# Patient Record
Sex: FEMALE | Race: WHITE | Employment: UNEMPLOYED | URBAN - METROPOLITAN AREA
[De-identification: names, ages, dates, MRNs, and addresses within clinical notes are randomized per-mention and may not be internally consistent; named-entity substitution may affect disease eponyms.]

---

## 2022-10-25 ENCOUNTER — HOSPITAL ENCOUNTER (OUTPATIENT)
Age: 32
Discharge: HOME | End: 2022-10-25
Payer: COMMERCIAL

## 2022-10-25 VITALS
SYSTOLIC BLOOD PRESSURE: 121 MMHG | HEART RATE: 93 BPM | OXYGEN SATURATION: 98 % | HEIGHT: 63 IN | DIASTOLIC BLOOD PRESSURE: 77 MMHG | BODY MASS INDEX: 24.8 KG/M2 | TEMPERATURE: 98 F | WEIGHT: 140 LBS | RESPIRATION RATE: 18 BRPM

## 2022-10-25 DIAGNOSIS — J02.0 STREPTOCOCCAL SORE THROAT: ICD-10-CM

## 2022-10-25 DIAGNOSIS — R07.0 THROAT PAIN: Primary | ICD-10-CM

## 2022-10-25 LAB
S PYO AG THROAT QL: POSITIVE
SARS-COV-2 RNA RESP QL NAA+PROBE: NOT DETECTED

## 2022-10-25 PROCEDURE — 99203 OFFICE O/P NEW LOW 30 MIN: CPT | Performed by: NURSE PRACTITIONER

## 2022-10-25 PROCEDURE — A9150 MISC/EXPER NON-PRESCRIPT DRU: HCPCS | Performed by: NURSE PRACTITIONER

## 2022-10-25 PROCEDURE — 87880 STREP A ASSAY W/OPTIC: CPT | Performed by: NURSE PRACTITIONER

## 2022-10-25 PROCEDURE — U0002 COVID-19 LAB TEST NON-CDC: HCPCS | Performed by: NURSE PRACTITIONER

## 2022-10-25 RX ORDER — ACETAMINOPHEN 500 MG
1000 TABLET ORAL ONCE
Status: COMPLETED | OUTPATIENT
Start: 2022-10-25 | End: 2022-10-25

## 2022-10-25 RX ORDER — AMOXICILLIN 500 MG/1
1000 TABLET, FILM COATED ORAL DAILY
Qty: 20 TABLET | Refills: 0 | Status: SHIPPED | OUTPATIENT
Start: 2022-10-25 | End: 2022-11-04

## 2022-10-25 NOTE — ED INITIAL ASSESSMENT (HPI)
Pt here w c/o sore throat, bumps to back of throat and painful to eat, pt also w c/o cough, runny nose and felt warm at home. Pt states symptoms x 2 days.

## 2023-12-26 ENCOUNTER — OFFICE VISIT (OUTPATIENT)
Dept: OBGYN CLINIC | Facility: CLINIC | Age: 33
End: 2023-12-26

## 2023-12-26 VITALS
WEIGHT: 151.81 LBS | SYSTOLIC BLOOD PRESSURE: 98 MMHG | DIASTOLIC BLOOD PRESSURE: 66 MMHG | BODY MASS INDEX: 27 KG/M2 | HEART RATE: 72 BPM

## 2023-12-26 DIAGNOSIS — N92.0 MENORRHAGIA WITH REGULAR CYCLE: Primary | ICD-10-CM

## 2023-12-26 DIAGNOSIS — N94.6 DYSMENORRHEA: ICD-10-CM

## 2023-12-26 DIAGNOSIS — N85.2 UTERINE HYPERTROPHY: ICD-10-CM

## 2023-12-26 PROCEDURE — 3078F DIAST BP <80 MM HG: CPT | Performed by: OBSTETRICS & GYNECOLOGY

## 2023-12-26 PROCEDURE — 3074F SYST BP LT 130 MM HG: CPT | Performed by: OBSTETRICS & GYNECOLOGY

## 2023-12-26 PROCEDURE — 99243 OFF/OP CNSLTJ NEW/EST LOW 30: CPT | Performed by: OBSTETRICS & GYNECOLOGY

## 2023-12-26 RX ORDER — LIDOCAINE HYDROCHLORIDE 20 MG/ML
1 SOLUTION ORAL; TOPICAL DAILY
COMMUNITY
Start: 2023-12-12

## 2023-12-26 RX ORDER — NAPROXEN 500 MG/1
500 TABLET ORAL 2 TIMES DAILY
COMMUNITY
Start: 2023-12-04

## 2023-12-26 RX ORDER — NORGESTIMATE AND ETHINYL ESTRADIOL 7DAYSX3 28
1 KIT ORAL DAILY
COMMUNITY
Start: 2023-12-04

## 2023-12-26 RX ORDER — FAMOTIDINE 20 MG/1
20 TABLET, FILM COATED ORAL 2 TIMES DAILY
COMMUNITY
Start: 2023-08-27

## 2023-12-26 NOTE — PROGRESS NOTES
HPI:    Patient ID: Kristan Ospina is a 26year old year old female. HPI  New patient, GYN consultation  Referred Dr. Sonia Tracy  26 yr old. Hx tubal ligation. History of 3  sections. LMP-. Referred by her primary care physician Dr. Micky Crawford of progressively worsening heavy and painful menstrual periods. They come every 30 days and last 8 days with 5 heavier days and 3 are the worst.  During those days she changes a pad every hour. Has strong cramping but has only used ibuprofen 1 tablet at a time. Counseled to use 2-3 at a time up to 4 times a day. She was prescribed oral contraceptives by her PCP but needed to discontinue due to depression after 1 month cycle. Discussed option of Mirena IUD. Patient cervix appears nulliparous and tight and may be challenging for insertion. Discussed consideration of endometrial ablation which may be challenging due to myometrial thinning and also cervical tightness. Will check myometrial thickness with early cycle transvaginal ultrasound after menses. Patient also contemplating hysterectomy as a possible alternative. Review of Systems   Constitutional: Negative. Cardiovascular: Negative. Gastrointestinal: Negative. Genitourinary: Negative. Skin: Negative. Neurological: Negative. Psychiatric/Behavioral: Negative. All other systems reviewed and are negative. No current outpatient medications on file. Physical Exam     Vitals: There were no vitals taken for this visit. Abdominal: Soft. Old healed low transverse  scar. Normal appearance and bowel sounds are normal. She exhibits no mass. There is no hepatosplenomegaly. There is no tenderness. There is no rebound and no CVA tenderness. No hernia. Hernia negative in the ventral area,  negative in the right inguinal area and negative in the left inguinal area.      Genitourinary:   Pelvic exam was performed with patient supine and chaperone present. External genitalia- normal.  Bartholin's and Brenham's glands normal.  Urethral meatus- without lesions, mass, or discharge. Urethra- normal without lesion, cyst, mass, or tenderness. Vulva- normal.  Labia majora and minora without lesions. Vagina- normal, no lesions or discharge. Moist and well supported. Bladder-  nontender. No masses. Normal support. No evidence of cystocele,  abnormal bladder neck mobility or evident urinary incontinence. Cervix- smooth, normal epithelium without lesions or discharge. No motion tenderness. Uterus- 1 1/2x normal size, shape, and contour. Nontender. No masses. Adnexa-  Nontender, no masses. Perineum- normal without lesions  Anus-  Normal appearing without lesions. ASSESSMENT/PLAN:  Menorrhagia with regular cycle  Dysmenorrhea  Uterine hypertrophy. Most consistent with adenomyosis due to myometrial appearance on ultrasound  Intolerant to oral contraceptives. Return for transvaginal ultrasound in early cycle. History of 3  sections. Check myometrial thickness for feasibility of NovaSure endometrial ablation. No outpatient encounter medications on file as of 2023. No facility-administered encounter medications on file as of 2023.

## 2024-01-29 ENCOUNTER — TELEPHONE (OUTPATIENT)
Dept: OBGYN CLINIC | Facility: CLINIC | Age: 34
End: 2024-01-29

## 2024-01-29 NOTE — TELEPHONE ENCOUNTER
Patient verified name and     Patient needing appt for US per OV note, patient to complete in early cycle. Appt  aware of scheduling details. Will call us if needing appt changed

## 2024-03-18 ENCOUNTER — TELEPHONE (OUTPATIENT)
Dept: OBGYN CLINIC | Facility: CLINIC | Age: 34
End: 2024-03-18

## 2024-03-18 NOTE — TELEPHONE ENCOUNTER
Pt called to schedule ultrasound with Dr. Garcia. Pt missed previous appointments as reminder calls are not in Kiswahili. Please call with .

## 2024-03-18 NOTE — TELEPHONE ENCOUNTER
Discussed consideration of endometrial ablation which may be challenging due to myometrial thinning and also cervical tightness. Will check myometrial thickness with early cycle transvaginal ultrasound after menses.       Patient verified name and     Patient scheuled for 3/26 with [facility] for ultrasound, complaining of pelvic pain similar to what she experienced in the past. Has been taking Ibuprofen with some relief. Patient will discuss at visit.

## 2024-03-26 ENCOUNTER — TELEPHONE (OUTPATIENT)
Dept: OBGYN CLINIC | Facility: CLINIC | Age: 34
End: 2024-03-26

## 2024-03-26 ENCOUNTER — OFFICE VISIT (OUTPATIENT)
Dept: OBGYN CLINIC | Facility: CLINIC | Age: 34
End: 2024-03-26

## 2024-03-26 DIAGNOSIS — N85.2 UTERINE HYPERTROPHY: ICD-10-CM

## 2024-03-26 DIAGNOSIS — Z01.818 PRE-OP TESTING: ICD-10-CM

## 2024-03-26 DIAGNOSIS — N94.6 DYSMENORRHEA: ICD-10-CM

## 2024-03-26 DIAGNOSIS — D64.9 ANEMIA, UNSPECIFIED TYPE: ICD-10-CM

## 2024-03-26 DIAGNOSIS — N92.0 MENORRHAGIA WITH REGULAR CYCLE: Primary | ICD-10-CM

## 2024-03-26 NOTE — PROGRESS NOTES
Subjective:     Patient ID: Julia Alvarado is a 33 year old female.    HPI  GYN follow-up visit  Patient with heavy and painful menses, uterine hypertrophy, history of 3  sections.  Last menstrual period March 15.  Transvaginal ultrasound shows a retroverted retroflexed uterus with a thin anterior lower uterine segment myometrial thickness of 2.5 mm.  Adjacent to bladder.  Small uterine fibroid.  Echogenic and heterogeneous myometrium suggestive of adenomyosis with some widening of the AP diameter.  Patient has been intolerant of hormones and OCP in the past.  Cervix is long and nulliparous and types and therefore anatomically would not accommodate IUD.  Not a candidate for endometrial ablation due to the anatomic orientation of the uterus and long and tightly closed cervix.  Patient would like definitive surgical treatment with hysterectomy.  We reviewed the risks and benefits and literature provided.  Recommend CBC and type and screen preoperatively.  Patient to see her primary care physician Dr. Leone and she says she was to have blood work.  History/Other:   Review of Systems   Constitutional: Negative.    Cardiovascular: Negative.    Gastrointestinal: Negative.    Genitourinary: Negative.    Skin: Negative.    Neurological: Negative.    Psychiatric/Behavioral: Negative.     All other systems reviewed and are negative.    Current Outpatient Medications   Medication Sig Dispense Refill    famotidine 20 MG Oral Tab Take 1 tablet (20 mg total) by mouth 2 (two) times daily. (Patient not taking: Reported on 2023)      FEROSUL 325 (65 Fe) MG Oral Tab Take 1 tablet (325 mg total) by mouth daily.      naproxen 500 MG Oral Tab Take 1 tablet (500 mg total) by mouth 2 (two) times daily. (Patient not taking: Reported on 2023)      TRI-SPRINTEC 0.18/0.215/0.25 MG-35 MCG Oral Tab Take 1 tablet by mouth daily. (Patient not taking: Reported on 2023)       Allergies:Not on File    Past Medical History:    Diagnosis Date    Anemia     Decorative tattoo     Dysmenorrhea       Past Surgical History:   Procedure Laterality Date          TUBAL LIGATION        Family History   Problem Relation Age of Onset    Diabetes Father     Cancer Mother       Social History:   Social History     Socioeconomic History    Marital status: Single   Tobacco Use    Smoking status: Never    Smokeless tobacco: Never   Vaping Use    Vaping Use: Never used   Substance and Sexual Activity    Alcohol use: Yes     Comment: socially    Drug use: Never        Objective:   Physical Exam    Assessment & Plan:   1. Menorrhagia with regular cycle    2. Uterine hypertrophy    3. Dysmenorrhea      Intolerant to hormones  Request for definitive surgical treatment with ALEYDA and will add prophylactic bilateral salpingectomy  Discussed risks and benefits and elects to proceed  Risks and benefits of surgery were discussed in detail including, but not limited to:  Bleeding, blood transfusion, infection, injury to intraabdominal organ such as bowel, bladder, or ureter; injury to blood vessels, persisting pain, need for reoperation, need for open laparotomy,venous thromboembolism, and anesthetic risks, among others.      Meds This Visit:  Requested Prescriptions      No prescriptions requested or ordered in this encounter       Imaging & Referrals:  None

## 2024-03-26 NOTE — TELEPHONE ENCOUNTER
Please schedule the following surgery:    Procedure: Total abdominal hysterectomy; prophylactic bilateral salpingectomy  Assist: (Y/N or none) yes  Date: Next available  Dx: Menorrhagia with regular cycle  Dysmenorrhea  Anemia  Pre-op appt: (Y/N or n/a) yes  Admission type: (IN/OUT/OBVS) in  Department of discharge(SDS/Floor): Floor  Expected length of stay:  Procedure length time (please enter amount you are requesting): 1-1/2 hours  Recovery time (patients always ask): 4 weeks  Medical Clearance: (Y/N) yes    Order preop CBC and type and screen at her preop visit

## 2024-04-03 NOTE — TELEPHONE ENCOUNTER
Scheduled patient for SX for Wednesday, 5/1/2024 @ 12 noon with TIARRA.     Informed patient of sx date and time, patient verbalized understanding.       Will mail major case letter to patient.   Patient has pre-op appt with TIARRA 4/11/2024 @ [facility] office.   Patient has appt for medical clearance with PCP Dr. Leone on Monday 4/15/2024.    Assist pending  Authorization pending.   Entered pre op blood work cbc and type and screen.       GARY MAYEN

## 2024-04-04 NOTE — TELEPHONE ENCOUNTER
Called [employer] [place] to update new date of service for patient. Spoke to Meme FRAUSTO same ref# [id number] it's still pending authorization will received a fax with decision in a couple days.

## 2024-04-04 NOTE — TELEPHONE ENCOUNTER
New sx date will be 5/15/2024 @ 9:00 am with TIARRA.    Sent over surgical change request to OR.    Notified patient of new sx date and time.     Will update insurance PA request.       Assist pending.      GARY MAYEN

## 2024-04-04 NOTE — TELEPHONE ENCOUNTER
Reach out to Harjinder Mahan   He informed me he has a commitment and won't be available after 11am for this date.      AJB please advise on another possible assist.     We already switched patient's sx twice.

## 2024-04-05 NOTE — TELEPHONE ENCOUNTER
Fax received from [facility]:    Request ID: [id number]  Requested Days/CPT Codes/Units: 77830    Approvals:  Codes/Approved date range:  2 days approved from 2024 up to but not includin2024  Days/Codes/Units Approved: N/A  Next Update due: 2024

## 2024-04-11 ENCOUNTER — OFFICE VISIT (OUTPATIENT)
Dept: OBGYN CLINIC | Facility: CLINIC | Age: 34
End: 2024-04-11

## 2024-04-11 VITALS
DIASTOLIC BLOOD PRESSURE: 80 MMHG | WEIGHT: 151 LBS | BODY MASS INDEX: 27 KG/M2 | HEART RATE: 75 BPM | SYSTOLIC BLOOD PRESSURE: 122 MMHG

## 2024-04-11 DIAGNOSIS — N85.2 UTERINE HYPERTROPHY: Primary | ICD-10-CM

## 2024-04-11 DIAGNOSIS — N92.0 MENORRHAGIA WITH REGULAR CYCLE: ICD-10-CM

## 2024-04-11 DIAGNOSIS — N94.6 DYSMENORRHEA: ICD-10-CM

## 2024-04-11 PROCEDURE — 99213 OFFICE O/P EST LOW 20 MIN: CPT | Performed by: OBSTETRICS & GYNECOLOGY

## 2024-04-11 PROCEDURE — 3074F SYST BP LT 130 MM HG: CPT | Performed by: OBSTETRICS & GYNECOLOGY

## 2024-04-11 PROCEDURE — 3079F DIAST BP 80-89 MM HG: CPT | Performed by: OBSTETRICS & GYNECOLOGY

## 2024-04-11 NOTE — PROGRESS NOTES
Subjective:     Patient ID: Julia Alvarado is a 33 year old female.    HPI  GYN visit  Scheduled for ALEYDA/BS 5/15.  LMP .  She has no complaints.  Discussed risks and benefits of surgery again and elects to proceed    History/Other:   Review of Systems  Current Outpatient Medications   Medication Sig Dispense Refill    famotidine 20 MG Oral Tab Take 1 tablet (20 mg total) by mouth 2 (two) times daily.      FEROSUL 325 (65 Fe) MG Oral Tab Take 1 tablet (325 mg total) by mouth daily.      naproxen 500 MG Oral Tab Take 1 tablet (500 mg total) by mouth 2 (two) times daily.      TRI-SPRINTEC 0.18/0.215/0.25 MG-35 MCG Oral Tab Take 1 tablet by mouth daily. (Patient not taking: Reported on 3/26/2024)       Allergies:Not on File    Past Medical History:    Anemia    Decorative tattoo    Dysmenorrhea      Past Surgical History:   Procedure Laterality Date          Tubal ligation        Family History   Problem Relation Age of Onset    Diabetes Father     Cancer Mother       Social History:   Social History     Socioeconomic History    Marital status: Single   Tobacco Use    Smoking status: Never    Smokeless tobacco: Never   Vaping Use    Vaping status: Never Used   Substance and Sexual Activity    Alcohol use: Yes     Comment: socially    Drug use: Never        Objective:   Physical Exam    Assessment & Plan:   1. Uterine hypertrophy    2. Menorrhagia with regular cycle    3. Dysmenorrhea      Planned for ALEYDA/BS  Risks and benefits of surgery were discussed in detail including, but not limited to:  Bleeding, blood transfusion, infection, injury to intraabdominal organ such as bowel, bladder, or ureter; injury to blood vessels, persisting pain, need for reoperation, need for open laparotomy,venous thromboembolism, and anesthetic risks, among others.      Meds This Visit:  Requested Prescriptions      No prescriptions requested or ordered in this encounter       Imaging & Referrals:  None

## (undated) NOTE — LETTER
4/11/2024              Julia Alvarado        [address]        [place] [address]         To whom it may concern,      My patient it having surgery on 5/15/2024. Patient will return to work 4 weeks later. If you have any questions fell free to call the office at [phone].      Sincerely,    Osmin Garcia MD  [facility] - OB/GYN  [address]  [place] [address] [phone]

## (undated) NOTE — LETTER
Date & Time: 10/25/2022, 11:48 AM  Patient: Magda Hurley  Encounter Provider(s):    ELOISE Ventura       To Whom It May Concern:    Magda Hurley was seen and treated in our department on 10/25/2022. She should not return to work until 10/27/22.     If you have any questions or concerns, please do not hesitate to call.        _____________________________  Physician/APC Signature

## (undated) NOTE — LETTER
MAJOR CASE PREOPERATIVE INSTRUCTIONS    4/3/2024      Dear Julia,    Your are having a Total abdominal hysterectomy; prophylactic bilateral salpingectomy  on 5/1/2024 at 12 noon.    Do not eat or drink anything (including water) after midnight the night before surgery.  Only take in clear liquids on Tuesday, the day before surgery.  Some examples of clear liquids are water, coffee/tea without milk/cream, clear broth, apple juice, or any juice that you can see through, jello, popsicles, lemon ice, or clear soda like ginger ale or 7-up.        You are to call this office if you have any cold or flu symptoms 2 days before your scheduled surgery.    Please avoid aspirin 7 days before surgery.  Avoid Ibuprofen, Motrin, Aleve, or Naprosyn for 3 days before surgery.    You will be contacted by PreAdmission Testing (PAT) usually within the week before surgery.  They will take a short medical history and let you know if any preoperative testing is needed.        Contact your insurance company to let them know you are having a Total abdominal hysterectomy; prophylactic bilateral salpingectomy  done.   Call our office if any pre-certification or pre-authorization is required.  If you have an HMO or EPO insurance, we will initiate the referral for you.    Call our office now to schedule your post-operative appointment for 1/2 weeks after surgery.    Please feel free to contact our office at [phone] if you have any questions regarding these instructions or your procedure.      Sincerely,          Osmin Garcia MD  [facility] - OB/GYN  [address]  [place] [id number] [phone]